# Patient Record
Sex: FEMALE | URBAN - METROPOLITAN AREA
[De-identification: names, ages, dates, MRNs, and addresses within clinical notes are randomized per-mention and may not be internally consistent; named-entity substitution may affect disease eponyms.]

---

## 2017-07-13 ENCOUNTER — APPOINTMENT (RX ONLY)
Dept: URBAN - METROPOLITAN AREA OTHER 13 | Facility: OTHER | Age: 37
Setting detail: DERMATOLOGY
End: 2017-07-13

## 2017-07-13 PROBLEM — D48.5 NEOPLASM OF UNCERTAIN BEHAVIOR OF SKIN: Status: ACTIVE | Noted: 2017-07-13

## 2017-07-13 PROCEDURE — 11100: CPT

## 2017-07-13 PROCEDURE — ? BIOPSY BY PUNCH METHOD

## 2017-07-13 NOTE — PROCEDURE: BIOPSY BY PUNCH METHOD
Epidermal Sutures: 5-0 Ethilon
X Size Of Lesion In Cm (Optional): 0
Render Post-Care Instructions In Note?: no
Wound Care: Aquaphor
Lab: [id number]
Hemostasis: Ligature
Path Notes (To The Dermatopathologist): Please RUSH
Dressing: bandage
Punch Size In Mm: 3
Anesthesia Type: 1% lidocaine with epinephrine
Biopsy Type: H and E
Suture Removal: 7 days
Anesthesia Volume In Cc: 0.5
Detail Level: Detailed
Home Suture Removal Text: Patient was provided a home suture removal kit and will remove their sutures at home.  If they have any questions or difficulties they will call the office.
Lab Facility: [id number]
Consent: Written consent was obtained and risks were reviewed including but not limited to scarring, infection, bleeding, scabbing, incomplete removal, nerve damage and allergy to anesthesia.
Notification Instructions: Patient will be notified of biopsy results. However, patient instructed to call the office if not contacted within 2 weeks.
Post-Care Instructions: I reviewed with the patient in detail post-care instructions. Patient is to keep the biopsy site dry overnight, and then apply bacitracin twice daily until healed. Patient may apply hydrogen peroxide soaks to remove any crusting.
Billing Type: Third-Party Bill

## 2017-07-20 ENCOUNTER — APPOINTMENT (RX ONLY)
Dept: URBAN - METROPOLITAN AREA OTHER 13 | Facility: OTHER | Age: 37
Setting detail: DERMATOLOGY
End: 2017-07-20

## 2017-07-20 DIAGNOSIS — D485 NEOPLASM OF UNCERTAIN BEHAVIOR OF SKIN: ICD-10-CM

## 2017-07-20 PROBLEM — D48.5 NEOPLASM OF UNCERTAIN BEHAVIOR OF SKIN: Status: ACTIVE | Noted: 2017-07-20

## 2017-07-20 PROCEDURE — ? OTHER

## 2017-07-20 PROCEDURE — ? SUTURE REMOVAL (GLOBAL PERIOD)

## 2017-07-20 ASSESSMENT — LOCATION ZONE DERM: LOCATION ZONE: FACE

## 2017-07-20 ASSESSMENT — LOCATION SIMPLE DESCRIPTION DERM: LOCATION SIMPLE: RIGHT CHEEK

## 2017-07-20 ASSESSMENT — LOCATION DETAILED DESCRIPTION DERM: LOCATION DETAILED: RIGHT CENTRAL MALAR CHEEK

## 2017-07-20 NOTE — PROCEDURE: SUTURE REMOVAL (GLOBAL PERIOD)
Add 00615 Cpt? (Important Note: In 2017 The Use Of 81401 Is Being Tracked By Cms To Determine Future Global Period Reimbursement For Global Periods): no
Detail Level: Simple

## 2017-07-20 NOTE — PROCEDURE: OTHER
Detail Level: Simple
Other (Free Text): discussed result with patient.  Discussed that there is a question as to whether this is a benign or malignant neoplasm.  Pt does note that this lesion appeared years ago and has been slowly enlarging.  Discussed case with Dr. Jamison Contreras.  We will refer her to his office
Note Text (......Xxx Chief Complaint.): This diagnosis correlates with the